# Patient Record
Sex: FEMALE | Race: WHITE | Employment: OTHER | URBAN - METROPOLITAN AREA
[De-identification: names, ages, dates, MRNs, and addresses within clinical notes are randomized per-mention and may not be internally consistent; named-entity substitution may affect disease eponyms.]

---

## 2020-10-23 NOTE — PROGRESS NOTES
Patricia nurse from [facility] called back to state that they cannot obtain covid till 10/26/2020. Notified [place] at [facility] office.  Pt will be rescheduled

## 2020-10-23 NOTE — PROGRESS NOTES
Spoke with Shireen Rojo, nurse at [facility].  They will obtain covid test today and fax results

## 2020-10-27 NOTE — PROGRESS NOTES
Spoke with Kassie Infante [address]. They will obtain covid test on 11/4/2020 and fax results.

## 2020-11-06 RX ORDER — ATORVASTATIN CALCIUM 10 MG/1
10 TABLET, FILM COATED ORAL NIGHTLY
COMMUNITY

## 2020-11-06 RX ORDER — MIDODRINE HYDROCHLORIDE 5 MG/1
5 TABLET ORAL
COMMUNITY

## 2020-11-06 RX ORDER — FAMOTIDINE 20 MG/1
20 TABLET, FILM COATED ORAL
COMMUNITY

## 2020-11-06 RX ORDER — GABAPENTIN 300 MG/1
300 CAPSULE ORAL DAILY
COMMUNITY

## 2020-11-06 RX ORDER — FLUOXETINE HYDROCHLORIDE 20 MG/1
40 CAPSULE ORAL DAILY
COMMUNITY

## 2020-11-06 RX ORDER — METOPROLOL TARTRATE 50 MG/1
50 TABLET, FILM COATED ORAL NIGHTLY
COMMUNITY

## 2020-11-06 RX ORDER — MONTELUKAST SODIUM 4 MG/1
2 TABLET, CHEWABLE ORAL DAILY
COMMUNITY

## 2020-11-06 RX ORDER — ONDANSETRON HYDROCHLORIDE 8 MG/1
8 TABLET, FILM COATED ORAL EVERY 8 HOURS PRN
COMMUNITY

## 2020-11-06 RX ORDER — INSULIN DEGLUDEC INJECTION 100 U/ML
54 INJECTION, SOLUTION SUBCUTANEOUS NIGHTLY
COMMUNITY

## 2020-11-06 RX ORDER — HYDROCHLOROTHIAZIDE 12.5 MG/1
25 CAPSULE, GELATIN COATED ORAL
COMMUNITY

## 2020-11-06 RX ORDER — LACTULOSE 10 G/15ML
30 SOLUTION ORAL
COMMUNITY

## 2020-11-06 RX ORDER — CALCIUM ACETATE 667 MG/1
CAPSULE ORAL
COMMUNITY

## 2020-11-06 RX ORDER — ASPIRIN 81 MG/1
81 TABLET ORAL
COMMUNITY

## 2020-11-06 NOTE — PROGRESS NOTES
Have you been tested for COVID  Yes           Have you been told you were positive for COVID No  Have you had any known exposure to someone that is positive for COVID No   Do you have a cough                   No              Do you have shortness of breath No                 Do you have a sore throat            No                Are you having chills                    No                Are you having muscle aches. No                    Please come to the hospital wearing a mask and have your significant other wear a mask as well. Both of you should check your temperature before leaving to come here,  if it is 100 or higher please call [phone] for instruction. Kitty PRE-ADMISSION TESTING INSTRUCTIONS      ARRIVAL INSTRUCTIONS:  [x] Parking the day of Surgery is located in the Main Entrance lot.   Upon entering the door, make an immediate right to the surgery reception desk    [x] Bring photo ID and insurance card        GENERAL INSTRUCTIONS:    [x] Nothing by mouth after midnight, including gum, candy, mints or water    [x] You may brush your teeth, but do not swallow any water    [x] Take medications as instructed with 1-2 oz of water    [x] Stop herbal supplements and vitamins 5 days prior to procedure    [x] Follow preop dosing of blood thinners per physician instructions    [x] Take 1/2 dose of evening insulin, but no insulin after midnight    [x] No oral diabetic medications after midnight    [x] If diabetic and have low blood sugar or feel symptomatic, take 1-2oz apple juice only    [x] Shower or bath with soap, lather and rinse well, AM of Surgery    [x] Jewelry, body piercing's, eyeglasses, contact lenses and dentures are not permitted into surgery (bring cases)      [x] Please do not wear any nail polish, make up or hair products on the day of surgery    [x] You can expect a call the business day prior to procedure to notify you if your arrival time changes    [x] A caregiver or family member must remain with the patient during their stay if they are mentally handicapped, have dementia, disoriented or unable to use a call light or would be a safety concern if left unattended    [x] Please notify surgeon if you develop any illness between now and time of surgery (cold, cough, sore throat, fever, nausea, vomiting) or any signs of infections  including skin, wounds, and dental.

## 2020-11-09 ENCOUNTER — ANESTHESIA EVENT (OUTPATIENT)
Dept: OPERATING ROOM | Age: 60
End: 2020-11-09
Payer: COMMERCIAL

## 2020-11-10 ENCOUNTER — ANESTHESIA (OUTPATIENT)
Dept: OPERATING ROOM | Age: 60
End: 2020-11-10
Payer: COMMERCIAL

## 2020-11-10 ENCOUNTER — HOSPITAL ENCOUNTER (OUTPATIENT)
Age: 60
Setting detail: OUTPATIENT SURGERY
Discharge: HOME | End: 2020-11-10
Attending: OPHTHALMOLOGY | Admitting: OPHTHALMOLOGY
Payer: COMMERCIAL

## 2020-11-10 VITALS
OXYGEN SATURATION: 98 % | RESPIRATION RATE: 165 BRPM | WEIGHT: 183 LBS | SYSTOLIC BLOOD PRESSURE: 138 MMHG | DIASTOLIC BLOOD PRESSURE: 62 MMHG | TEMPERATURE: 97.4 F | HEART RATE: 54 BPM | BODY MASS INDEX: 34.55 KG/M2 | HEIGHT: 61 IN

## 2020-11-10 VITALS
SYSTOLIC BLOOD PRESSURE: 98 MMHG | DIASTOLIC BLOOD PRESSURE: 47 MMHG | OXYGEN SATURATION: 100 % | RESPIRATION RATE: 22 BRPM

## 2020-11-10 PROBLEM — H43.11 VITREOUS HEMORRHAGE, RIGHT EYE (HCC): Status: ACTIVE | Noted: 2020-11-10

## 2020-11-10 LAB
METER GLUCOSE: 91 MG/DL (ref 74–99)
POTASSIUM SERPL-SCNC: 4.7 MMOL/L (ref 3.5–5)

## 2020-11-10 PROCEDURE — 2580000003 HC RX 258: Performed by: OPHTHALMOLOGY

## 2020-11-10 PROCEDURE — 6360000002 HC RX W HCPCS: Performed by: OPHTHALMOLOGY

## 2020-11-10 PROCEDURE — 3700000001 HC ADD 15 MINUTES (ANESTHESIA): Performed by: OPHTHALMOLOGY

## 2020-11-10 PROCEDURE — 2720000010 HC SURG SUPPLY STERILE: Performed by: OPHTHALMOLOGY

## 2020-11-10 PROCEDURE — 3600000003 HC SURGERY LEVEL 3 BASE: Performed by: OPHTHALMOLOGY

## 2020-11-10 PROCEDURE — 2709999900 HC NON-CHARGEABLE SUPPLY: Performed by: OPHTHALMOLOGY

## 2020-11-10 PROCEDURE — 6370000000 HC RX 637 (ALT 250 FOR IP): Performed by: OPHTHALMOLOGY

## 2020-11-10 PROCEDURE — 36415 COLL VENOUS BLD VENIPUNCTURE: CPT

## 2020-11-10 PROCEDURE — 7100000010 HC PHASE II RECOVERY - FIRST 15 MIN: Performed by: OPHTHALMOLOGY

## 2020-11-10 PROCEDURE — 3700000000 HC ANESTHESIA ATTENDED CARE: Performed by: OPHTHALMOLOGY

## 2020-11-10 PROCEDURE — 7100000011 HC PHASE II RECOVERY - ADDTL 15 MIN: Performed by: OPHTHALMOLOGY

## 2020-11-10 PROCEDURE — 3600000013 HC SURGERY LEVEL 3 ADDTL 15MIN: Performed by: OPHTHALMOLOGY

## 2020-11-10 PROCEDURE — 2500000003 HC RX 250 WO HCPCS: Performed by: NURSE ANESTHETIST, CERTIFIED REGISTERED

## 2020-11-10 PROCEDURE — 82962 GLUCOSE BLOOD TEST: CPT

## 2020-11-10 PROCEDURE — 2500000003 HC RX 250 WO HCPCS: Performed by: OPHTHALMOLOGY

## 2020-11-10 PROCEDURE — 84132 ASSAY OF SERUM POTASSIUM: CPT

## 2020-11-10 PROCEDURE — 6360000002 HC RX W HCPCS: Performed by: NURSE ANESTHETIST, CERTIFIED REGISTERED

## 2020-11-10 RX ORDER — SODIUM CHLORIDE 9 MG/ML
INJECTION, SOLUTION INTRAVENOUS CONTINUOUS
Status: DISCONTINUED | OUTPATIENT
Start: 2020-11-10 | End: 2020-11-10 | Stop reason: HOSPADM

## 2020-11-10 RX ORDER — PHENYLEPHRINE HYDROCHLORIDE 100 MG/ML
1 SOLUTION/ DROPS OPHTHALMIC PRN
Status: COMPLETED | OUTPATIENT
Start: 2020-11-10 | End: 2020-11-10

## 2020-11-10 RX ORDER — FLURBIPROFEN SODIUM 0.3 MG/ML
1 SOLUTION/ DROPS OPHTHALMIC PRN
Status: COMPLETED | OUTPATIENT
Start: 2020-11-10 | End: 2020-11-10

## 2020-11-10 RX ORDER — ATROPINE SULFATE 10 MG/ML
1 SOLUTION/ DROPS OPHTHALMIC PRN
Status: DISCONTINUED | OUTPATIENT
Start: 2020-11-10 | End: 2020-11-10 | Stop reason: HOSPADM

## 2020-11-10 RX ORDER — ATROPINE SULFATE 10 MG/ML
SOLUTION/ DROPS OPHTHALMIC PRN
Status: DISCONTINUED | OUTPATIENT
Start: 2020-11-10 | End: 2020-11-10 | Stop reason: ALTCHOICE

## 2020-11-10 RX ORDER — CEFAZOLIN SODIUM 1 G/3ML
INJECTION, POWDER, FOR SOLUTION INTRAMUSCULAR; INTRAVENOUS PRN
Status: DISCONTINUED | OUTPATIENT
Start: 2020-11-10 | End: 2020-11-10 | Stop reason: ALTCHOICE

## 2020-11-10 RX ORDER — ONDANSETRON 2 MG/ML
INJECTION INTRAMUSCULAR; INTRAVENOUS PRN
Status: DISCONTINUED | OUTPATIENT
Start: 2020-11-10 | End: 2020-11-10 | Stop reason: SDUPTHER

## 2020-11-10 RX ORDER — LIDOCAINE HYDROCHLORIDE 10 MG/ML
INJECTION, SOLUTION EPIDURAL; INFILTRATION; INTRACAUDAL; PERINEURAL PRN
Status: DISCONTINUED | OUTPATIENT
Start: 2020-11-10 | End: 2020-11-10 | Stop reason: SDUPTHER

## 2020-11-10 RX ORDER — PROPOFOL 10 MG/ML
INJECTION, EMULSION INTRAVENOUS PRN
Status: DISCONTINUED | OUTPATIENT
Start: 2020-11-10 | End: 2020-11-10 | Stop reason: SDUPTHER

## 2020-11-10 RX ORDER — DEXAMETHASONE SODIUM PHOSPHATE 10 MG/ML
INJECTION INTRAMUSCULAR; INTRAVENOUS PRN
Status: DISCONTINUED | OUTPATIENT
Start: 2020-11-10 | End: 2020-11-10 | Stop reason: ALTCHOICE

## 2020-11-10 RX ORDER — DEXAMETHASONE SODIUM PHOSPHATE 4 MG/ML
INJECTION, SOLUTION INTRA-ARTICULAR; INTRALESIONAL; INTRAMUSCULAR; INTRAVENOUS; SOFT TISSUE PRN
Status: DISCONTINUED | OUTPATIENT
Start: 2020-11-10 | End: 2020-11-10 | Stop reason: SDUPTHER

## 2020-11-10 RX ORDER — SODIUM CHLORIDE 0.9 % (FLUSH) 0.9 %
10 SYRINGE (ML) INJECTION PRN
Status: DISCONTINUED | OUTPATIENT
Start: 2020-11-10 | End: 2020-11-10 | Stop reason: HOSPADM

## 2020-11-10 RX ORDER — SODIUM CHLORIDE 0.9 % (FLUSH) 0.9 %
10 SYRINGE (ML) INJECTION EVERY 12 HOURS SCHEDULED
Status: CANCELLED | OUTPATIENT
Start: 2020-11-10

## 2020-11-10 RX ORDER — SODIUM CHLORIDE 0.9 % (FLUSH) 0.9 %
10 SYRINGE (ML) INJECTION PRN
Status: CANCELLED | OUTPATIENT
Start: 2020-11-10

## 2020-11-10 RX ORDER — SODIUM CHLORIDE 0.9 % (FLUSH) 0.9 %
10 SYRINGE (ML) INJECTION EVERY 12 HOURS SCHEDULED
Status: DISCONTINUED | OUTPATIENT
Start: 2020-11-10 | End: 2020-11-10 | Stop reason: HOSPADM

## 2020-11-10 RX ADMIN — PHENYLEPHRINE HYDROCHLORIDE 1 DROP: 100 SOLUTION/ DROPS OPHTHALMIC at 06:19

## 2020-11-10 RX ADMIN — Medication 1 DROP: at 06:19

## 2020-11-10 RX ADMIN — Medication 1 DROP: at 06:29

## 2020-11-10 RX ADMIN — PHENYLEPHRINE HYDROCHLORIDE 1 DROP: 100 SOLUTION/ DROPS OPHTHALMIC at 06:29

## 2020-11-10 RX ADMIN — PROPOFOL 40 MG: 10 INJECTION, EMULSION INTRAVENOUS at 07:32

## 2020-11-10 RX ADMIN — ONDANSETRON 4 MG: 2 INJECTION INTRAMUSCULAR; INTRAVENOUS at 07:37

## 2020-11-10 RX ADMIN — LIDOCAINE HYDROCHLORIDE 20 MG: 10 INJECTION, SOLUTION EPIDURAL; INFILTRATION; INTRACAUDAL; PERINEURAL at 07:32

## 2020-11-10 RX ADMIN — SODIUM CHLORIDE: 9 INJECTION, SOLUTION INTRAVENOUS at 07:26

## 2020-11-10 RX ADMIN — Medication 1 DROP: at 06:24

## 2020-11-10 RX ADMIN — DEXAMETHASONE SODIUM PHOSPHATE 10 MG: 4 INJECTION, SOLUTION INTRAMUSCULAR; INTRAVENOUS at 07:37

## 2020-11-10 RX ADMIN — PHENYLEPHRINE HYDROCHLORIDE 1 DROP: 100 SOLUTION/ DROPS OPHTHALMIC at 06:24

## 2020-11-10 RX ADMIN — ATROPINE SULFATE 1 DROP: 10 SOLUTION/ DROPS OPHTHALMIC at 06:17

## 2020-11-10 ASSESSMENT — PAIN SCALES - GENERAL
PAINLEVEL_OUTOF10: 0

## 2020-11-10 ASSESSMENT — PAIN - FUNCTIONAL ASSESSMENT: PAIN_FUNCTIONAL_ASSESSMENT: 0-10

## 2020-11-10 NOTE — ANESTHESIA PRE PROCEDURE
Department of Anesthesiology  Preprocedure Note       Name:  Tonio Carroll   Age:  63 y.o.  :  1960                                          MRN:  [id number]         Date:  11/10/2020      Surgeon: Lara Kebede):  Janae Camp MD    Procedure: Procedure(s):  PARS PLANA  VITRECTOMY 25 GAUGE, VITREOUS HEMORRHAGE REMOVAL ENDO LASER RIGHT EYE (FACILITY)    Medications prior to admission:   Prior to Admission medications    Medication Sig Start Date End Date Taking? Authorizing Provider   metoprolol tartrate (LOPRESSOR) 50 MG tablet Take 50 mg by mouth nightly   Yes Historical Provider, MD   Carboxymethylcellulose Sodium (ARTIFICIAL TEARS OP) Place into the right eye 2 times daily   Yes Historical Provider, MD   Insulin Degludec (TRESIBA FLEXTOUCH) 100 UNIT/ML SOPN Inject 54 Units into the skin nightly   Yes Historical Provider, MD   ondansetron (ZOFRAN) 8 MG tablet Take 8 mg by mouth every 8 hours as needed for Nausea or Vomiting   Yes Historical Provider, MD   lactulose (CHRONULAC) 10 GM/15ML solution Take 30 g by mouth three times a week Tues thurs sat   Yes Historical Provider, MD   atorvastatin (LIPITOR) 10 MG tablet Take 10 mg by mouth nightly   Yes Historical Provider, MD   insulin lispro (ADMELOG) 100 UNIT/ML injection vial Inject 6 Units into the skin 2 times daily   Yes Historical Provider, MD   FLUoxetine (PROZAC) 20 MG capsule Take 40 mg by mouth daily   Yes Historical Provider, MD   aspirin 81 MG EC tablet Take 81 mg by mouth four times a week  Sat Sun    Yes Historical Provider, MD   hydroCHLOROthiazide (MICROZIDE) 12.5 MG capsule Take 25 mg by mouth four times a week  Sat Sun    Yes Historical Provider, MD   gabapentin (NEURONTIN) 300 MG capsule Take 300 mg by mouth daily.    Yes Historical Provider, MD   famotidine (PEPCID) 20 MG tablet Take 20 mg by mouth four times a week  Sat Sun   Yes Historical Provider, MD   midodrine (PROAMATINE) 5 MG tablet Take 5 mg by mouth °C)   TempSrc:  Temporal   SpO2:  98%   Weight: 183 lb (83 kg) 183 lb (83 kg)   Height: 5' 1\" (1.549 m) 5' 1\" (1.549 m)                                              BP Readings from Last 3 Encounters:   11/10/20 139/64       NPO Status: Time of last liquid consumption: 0000                        Time of last solid consumption: 0000                        Date of last liquid consumption: 11/09/20                        Date of last solid food consumption: 11/09/20    BMI:   Wt Readings from Last 3 Encounters:   11/10/20 183 lb (83 kg)     Body mass index is 34.58 kg/m². CBC:   Lab Results   Component Value Date    WBC 9.4 02/11/2015    RBC 3.10 02/11/2015    HGB 10.0 02/11/2015    HCT 28.7 02/11/2015    MCV 92.6 02/11/2015    RDW 11.8 02/11/2015     02/11/2015       CMP:   Lab Results   Component Value Date     02/11/2015    K 4.8 02/11/2015    CL 98 02/11/2015    CO2 26 02/11/2015    BUN 59 02/11/2015    CREATININE 2.9 02/11/2015    GFRAA 20 02/11/2015    LABGLOM 17 02/11/2015    GLUCOSE 152 02/11/2015    PROT 6.6 02/06/2015    CALCIUM 9.1 02/11/2015    BILITOT 0.2 02/06/2015    ALKPHOS 73 02/06/2015    AST 25 02/06/2015    ALT 24 02/06/2015       POC Tests: No results for input(s): POCGLU, POCNA, POCK, POCCL, POCBUN, POCHEMO, POCHCT in the last 72 hours.     Coags: No results found for: PROTIME, INR, APTT    HCG (If Applicable): No results found for: PREGTESTUR, PREGSERUM, HCG, HCGQUANT     ABGs: No results found for: PHART, PO2ART, PQN0UBQ, MAZ6CND, BEART, N9ZXPYXP     Type & Screen (If Applicable):  No results found for: LABABO, LABRH    Drug/Infectious Status (If Applicable):  No results found for: HIV, HEPCAB    COVID-19 Screening (If Applicable): No results found for: COVID19      Anesthesia Evaluation  Patient summary reviewed no history of anesthetic complications:   Airway: Mallampati: II  TM distance: >3 FB   Neck ROM: limited  Mouth opening: > = 3 FB Dental:    (+) edentulous Pulmonary:Negative Pulmonary ROS breath sounds clear to auscultation                             Cardiovascular:    (+) hypertension:,         Rhythm: regular  Rate: normal           Beta Blocker:  Dose within 24 Hrs         Neuro/Psych:   (+) CVA:, TIA, psychiatric history:depression/anxiety             GI/Hepatic/Renal:   (+) GERD:, renal disease: dialysis,           Endo/Other:    (+) Diabetesusing insulin, hypothyroidism::., .          Pt had no PAT visit       Abdominal:           Vascular:                                      Anesthesia Plan      MAC     ASA 4       Induction: intravenous. Anesthetic plan and risks discussed with patient. Plan discussed with CRNA.                   Bina Pizarro MD   11/10/2020

## 2020-11-10 NOTE — ANESTHESIA POSTPROCEDURE EVALUATION
Department of Anesthesiology  Postprocedure Note    Patient: Bianca Duong  MRN: [id number]  YOB: 1960  Date of evaluation: 11/10/2020  Time:  10:13 AM     Procedure Summary     Date:  11/10/20 Room / Location:  [facility] OR 03 / [facility]    Anesthesia Start:   Anesthesia Stop:      Procedure:  PARS PLANA  VITRECTOMY 25 GAUGE, VITREOUS HEMORRHAGE REMOVAL ENDO LASER RIGHT EYE (FACILITY) (Right ) Diagnosis:  (VITREOUS HEMORRHAGE RIGHT EYE)    Surgeon:  Luis Mejia MD Responsible Provider:      Anesthesia Type:  MAC ASA Status:  4          Anesthesia Type: MAC    Jamila Phase I: Jamila Score: 9    Jamila Phase II: Jamila Score: 10    Last vitals: Reviewed and per EMR flowsheets.        Anesthesia Post Evaluation    Patient location during evaluation: PACU  Patient participation: complete - patient participated  Level of consciousness: awake  Pain score: 3  Airway patency: patent  Nausea & Vomiting: no nausea and no vomiting  Complications: no  Cardiovascular status: blood pressure returned to baseline  Respiratory status: acceptable  Hydration status: euvolemic

## 2020-11-10 NOTE — OP NOTE
[id number] [address]                                OPERATIVE REPORT    PATIENT NAME: Fadia Baxter                   :        1960  MED REC NO:   [id number]                            ROOM:  ACCOUNT NO:   [de-identified]                           ADMIT DATE: 11/10/2020  PROVIDER:     Svetlana Fofana MD    DATE OF PROCEDURE:  11/10/2020    PREOPERATIVE DIAGNOSIS:  Nonclearing vitreous hemorrhage with suspected  proliferative diabetic retinopathy, right eye. POSTOPERATIVE DIAGNOSIS:  Nonclearing vitreous hemorrhage due to  proliferative diabetic retinopathy, right eye. PROCEDURE PERFORMED:  Pars plana vitrectomy with panretinal laser  photocoagulation, right eye. SURGEON:  Svetlana Fofana MD.    ASSISTANT:  Shruthi Neil SA.    ANESTHESIA:  MAC.    COMPLICATIONS:  None. ESTIMATED BLOOD LOSS:  Negligible. INDICATIONS:  This 56-year-old female with extensive medical history of  severe diabetic complications including severe diabetic nephropathy  presented with longstanding visual loss in the right eye. She was found  to have a dense old vitreous hemorrhage with no evidence of retinal  detachment on office exam.  She was agreeable to surgical removal of the  hemorrhage and treatment of this suspected proliferative diabetic  retinopathy in hopes of improving and stabilizing visual function in the  right eye. DESCRIPTION OF PROCEDURE:  After informed consent was obtained, the  patient was taken to the operating room #3. Appropriate anesthesia  monitors were placed. IV sedation was given by Anesthesiology. Local  anesthesia consisting of a 1:1 mixture of 4% Xylocaine plain and 0.75%  Marcaine was injected in an amount of 5 mL retrobulbar, 10 mL modified  van Lint block to the right side. The patient was prepped and draped in  usual sterile fashion. A lid speculum was placed.   Transconjunctival  25-gauge trocar system was used to insert cannulae through the pars  plana 4 mm posterior to the limbus at 8:15 and 9:45 o'clock 3.5 mm  posterior to the limbus at 2 o'clock. Infusion was placed through the  8:15 o'clock cannula. The tip was verified to be clear of pars plana  and retina and the infusion turned on with BSS including 3 mL of 50%  dextrose in a bottle. Vitreous cutter and endoilluminator were inserted  and anterior vitreous removed. There was dense deep pigmented  hemorrhage noted diffusely through the vitreous and moderate opacity of  the crystalline lens limiting visualization of retina. As core vitreous  was gradually removed, the retina became visible and was found to be  attached. There were areas of regressed neovascularization on the disk  in the nasal inferior and superior mid periphery, but no signs of active  hemorrhage. Vitreous attachments to the neovascularization were trimmed  and a complete vitrectomy performed within limits of the crystalline  lens and limited visibility from the cataract. Peripheral examination  revealed no signs of tears or detachments. Endolaser was performed  using a directional probe placing a total of 1430 pulses at 0.1-second  duration and 200 milliwatts power in a complete panretinal pattern  sparing the long posterior ciliary nerves. Infusion pressure was  lowered to 20 mmHg. Cannulae were removed and all sclerotomies noted to  be self-sealed. Subconjunctival injections of 50 mg Ancef and 5 mg  Decadron were given inferonasally and verified to be extrabulbar. Lid  speculum was removed. Atropine 1% solution and TobraDex ophthalmic  ointment applied in the conjunctival fornices. The eyelids were closed. Cotton patch applied. Eye shield applied. The patient was taken to the  recovery room in stable condition.         Virgen Michael MD    D: 11/10/2020 8:19:28       T: 11/10/2020 8:24:48     [id number]  Job#: [id number]     Doc#: [id number]    CC:

## 2020-11-10 NOTE — PROGRESS NOTES
Spoke with RN at facility and she informed the medications that patient was to receive this am she did and remaining medications were on hold

## 2020-11-10 NOTE — BRIEF OP NOTE
Brief Postoperative Note      Patient: Flakito Fulton  YOB: 1960  MRN: [id number]    Date of Procedure: 11/10/2020    Pre-Op Diagnosis: VITREOUS HEMORRHAGE RIGHT EYE    Post-Op Diagnosis: nonclearing vitreous hemorrhage due to proliferative diabetic retinopathy, right eye       Procedure(s):  PARS PLANA  VITRECTOMY 25 GAUGE, VITREOUS HEMORRHAGE REMOVAL ENDO LASER RIGHT EYE (FACILITY)    Surgeon(s):  Irvin Millan MD    Assistant:  Surgical Assistant: Maicol Vazquez SA    Anesthesia: Monitor Anesthesia Care    Estimated Blood Loss (mL): Minimal    Complications: None    Specimens:   * No specimens in log *    Implants:  * No implants in log *      Drains: * No LDAs found *    Findings: NVD and multiple NVE, no active hemorrhage    Electronically signed by Irvin Millan MD on 11/10/2020 at 8:34 AM

## (undated) DEVICE — PAD PREP L 2 PLY 70% ISO ALC NONWOVEN SFT ABSRB TOP ANTISEP

## (undated) DEVICE — 3M™ TEGADERM™ TRANSPARENT FILM DRESSING FRAME STYLE, 1626W, 4 IN X 4-3/4 IN (10 CM X 12 CM), 50/CT 4CT/CASE: Brand: 3M™ TEGADERM™

## (undated) DEVICE — Device

## (undated) DEVICE — PACK PROCEDURE SURG RETINAL ST ES CUST

## (undated) DEVICE — PROBE LSR 25GA DIR FOR VISION

## (undated) DEVICE — SYRINGE, LUER LOCK, 5ML: Brand: MEDLINE

## (undated) DEVICE — BIOM OPTIC SET DISPOSABLE, WITH BIOM HD FLEX LENS FOR F=175 MM OR F=200 MM: Brand: BIOM OPTIC SET DISPOSABLE, WITH BIOM HD FLEX LENS FOR F=175 MM OR F=200 MM

## (undated) DEVICE — GOWN,SIRUS,FABRNF,XL,20/CS: Brand: MEDLINE

## (undated) DEVICE — SHIELD EYE W3XL2.5IN UNIV CLR PLAS LTWT

## (undated) DEVICE — SURGICAL PROCEDURE PACK 25 GA VLV CPM 10K

## (undated) DEVICE — SET 25G PAN 1

## (undated) DEVICE — SET 25G PAN 2

## (undated) DEVICE — SOLUTION IV IRRIG WATER 1000ML POUR BRL 2F7114

## (undated) DEVICE — 40436 HEAD REST OCULAR: Brand: 40436 HEAD REST OCULAR

## (undated) DEVICE — GLOVE SURG SZ 7 CRM LTX FREE POLYISOPRENE POLYMER BEAD ANTI

## (undated) DEVICE — NEEDLE HYPO 25GA L1.5IN BLU POLYPR HUB S STL REG BVL STR

## (undated) DEVICE — DRAPE MICSCP FULL FLD STRL OCULUS ZEISS

## (undated) DEVICE — SYRINGE, LUER LOCK, 10ML: Brand: MEDLINE

## (undated) DEVICE — GLOVE ORANGE PI 7   MSG9070

## (undated) DEVICE — SYRINGE 20ML LL S/C 50

## (undated) DEVICE — NEEDLE FLTR 18GA L1.5IN MEM THK5UM BLNT DISP

## (undated) DEVICE — NEEDLE RETROBLB L1 1 2IN OD25GA SHRP

## (undated) DEVICE — SET VITRECTOMY

## (undated) DEVICE — SOLUTION IV IRRIG 500ML 0.9% SODIUM CHL 2F7123

## (undated) DEVICE — GLOVE SURG SZ 8 CRM LTX FREE POLYISOPRENE POLYMER BEAD ANTI

## (undated) DEVICE — BRACELET ID ALERT FOR PT INFO ISPAN

## (undated) DEVICE — 4-PORT MANIFOLD: Brand: NEPTUNE 2